# Patient Record
Sex: FEMALE | URBAN - METROPOLITAN AREA
[De-identification: names, ages, dates, MRNs, and addresses within clinical notes are randomized per-mention and may not be internally consistent; named-entity substitution may affect disease eponyms.]

---

## 2023-05-23 ENCOUNTER — APPOINTMENT (OUTPATIENT)
Dept: URBAN - METROPOLITAN AREA CLINIC 191 | Age: 23
Setting detail: DERMATOLOGY
End: 2023-05-23

## 2023-05-23 DIAGNOSIS — L40.0 PSORIASIS VULGARIS: ICD-10-CM

## 2023-05-23 PROCEDURE — OTHER ADDITIONAL NOTES: OTHER

## 2023-05-23 PROCEDURE — OTHER PRESCRIPTION MEDICATION MANAGEMENT: OTHER

## 2023-05-23 PROCEDURE — 99204 OFFICE O/P NEW MOD 45 MIN: CPT

## 2023-05-23 PROCEDURE — OTHER COUNSELING: OTHER

## 2023-05-23 PROCEDURE — OTHER PRESCRIPTION: OTHER

## 2023-05-23 RX ORDER — CLOBETASOL PROPIONATE 0.5 MG/ML
SOLUTION TOPICAL
Qty: 50 | Refills: 1 | Status: ERX | COMMUNITY
Start: 2023-05-23

## 2023-05-23 RX ORDER — CALCIPOTRIENE 0.05 MG/ML
SOLUTION TOPICAL
Qty: 60 | Refills: 2 | Status: ERX | COMMUNITY
Start: 2023-05-23

## 2023-05-23 ASSESSMENT — LOCATION SIMPLE DESCRIPTION DERM: LOCATION SIMPLE: LEFT SCALP

## 2023-05-23 ASSESSMENT — LOCATION ZONE DERM: LOCATION ZONE: SCALP

## 2023-05-23 ASSESSMENT — LOCATION DETAILED DESCRIPTION DERM: LOCATION DETAILED: LEFT MEDIAL FRONTAL SCALP

## 2023-05-23 NOTE — PROCEDURE: ADDITIONAL NOTES
Additional Notes: 80-90% scalp covered in psoriasis\\nIL 17 vs IL 23 if we have to change biologics depending on GI results (Pt is currently doing Humira injections for ankylosing spondylitis) (She is getting evaluated for possible IBD)
Detail Level: Simple
Render Risk Assessment In Note?: no

## 2023-05-23 NOTE — PROCEDURE: PRESCRIPTION MEDICATION MANAGEMENT
Plan: Onset about a month ago, very itchy. No fingernail involvement. Just scalp\\n\\nStart Rx clobetasol 0.05 % scalp solution- Apply thin layer to scalp every night for 4 weeks then stop\\nStart Rx calcipotriene 0.005 % scalp solution- Apply thin layer to scalp every night
Render In Strict Bullet Format?: No
Detail Level: Zone

## 2023-07-14 ENCOUNTER — APPOINTMENT (OUTPATIENT)
Dept: URBAN - METROPOLITAN AREA CLINIC 191 | Age: 23
Setting detail: DERMATOLOGY
End: 2023-07-14

## 2023-07-14 DIAGNOSIS — L40.0 PSORIASIS VULGARIS: ICD-10-CM

## 2023-07-14 PROCEDURE — 99213 OFFICE O/P EST LOW 20 MIN: CPT

## 2023-07-14 PROCEDURE — OTHER MIPS QUALITY: OTHER

## 2023-07-14 PROCEDURE — OTHER ADDITIONAL NOTES: OTHER

## 2023-07-14 PROCEDURE — OTHER COUNSELING: OTHER

## 2023-07-14 PROCEDURE — OTHER PRESCRIPTION MEDICATION MANAGEMENT: OTHER

## 2023-07-14 PROCEDURE — OTHER PRESCRIPTION: OTHER

## 2023-07-14 RX ORDER — PHARMACY COMPOUNDING ACCESSORY
EACH MISCELLANEOUS
Qty: 1 | Refills: 0 | Status: ERX | COMMUNITY
Start: 2023-07-14

## 2023-07-14 RX ORDER — CLOBETASOL PROPIONATE 0.5 MG/ML
SOLUTION TOPICAL
Qty: 50 | Refills: 1 | Status: ERX

## 2023-07-14 RX ORDER — CALCIPOTRIENE 0.05 MG/ML
SOLUTION TOPICAL
Qty: 60 | Refills: 2 | Status: ERX

## 2023-07-14 ASSESSMENT — LOCATION ZONE DERM: LOCATION ZONE: SCALP

## 2023-07-14 ASSESSMENT — LOCATION SIMPLE DESCRIPTION DERM: LOCATION SIMPLE: LEFT SCALP

## 2023-07-14 ASSESSMENT — LOCATION DETAILED DESCRIPTION DERM: LOCATION DETAILED: LEFT MEDIAL FRONTAL SCALP

## 2023-07-14 ASSESSMENT — BSA PSORIASIS: % BODY COVERED IN PSORIASIS: 1

## 2023-07-14 NOTE — PROCEDURE: PRESCRIPTION MEDICATION MANAGEMENT
Plan: much better! tapering topical steroid now. Planning to get colonoscopy next mo for IBD w/u. \\nscalp psoriasis Onset about a May 2023, very itchy. No fingernail involvement. Just scalp\\n decrease Rx clobetasol 0.05 % scalp solution- Apply thin layer to scalp every Saturday morning and Saturday night and Sunday morning and night.\\nContinue Rx calcipotriene 0.005 % scalp solution- Apply thin layer to scalp every night
Render In Strict Bullet Format?: No
Detail Level: Zone

## 2023-07-14 NOTE — PROCEDURE: ADDITIONAL NOTES
Additional Notes: initially 80-90% scalp covered in psoriasis\\n(Pt is currently doing Humira injections for ankylosing spondylitis) (She is getting evaluated for possible IBD)
Detail Level: Simple
Render Risk Assessment In Note?: no

## 2024-03-14 ENCOUNTER — APPOINTMENT (OUTPATIENT)
Dept: URBAN - METROPOLITAN AREA CLINIC 191 | Age: 24
Setting detail: DERMATOLOGY
End: 2024-03-14

## 2024-03-14 DIAGNOSIS — L40.0 PSORIASIS VULGARIS: ICD-10-CM

## 2024-03-14 PROCEDURE — OTHER ADDITIONAL NOTES: OTHER

## 2024-03-14 PROCEDURE — OTHER PRESCRIPTION: OTHER

## 2024-03-14 PROCEDURE — OTHER MIPS QUALITY: OTHER

## 2024-03-14 PROCEDURE — 99214 OFFICE O/P EST MOD 30 MIN: CPT

## 2024-03-14 PROCEDURE — OTHER COUNSELING: OTHER

## 2024-03-14 PROCEDURE — OTHER PRESCRIPTION MEDICATION MANAGEMENT: OTHER

## 2024-03-14 RX ORDER — ROFLUMILAST 3 MG/G
CREAM TOPICAL
Qty: 60 | Refills: 1 | Status: ERX | COMMUNITY
Start: 2024-03-14

## 2024-03-14 RX ORDER — ROFLUMILAST 3 MG/G
AEROSOL, FOAM TOPICAL
Qty: 60 | Refills: 2 | Status: ERX | COMMUNITY
Start: 2024-03-14

## 2024-03-14 RX ORDER — CLOBETASOL PROPIONATE 0.5 MG/ML
SOLUTION TOPICAL
Qty: 50 | Refills: 1 | Status: ERX

## 2024-03-14 RX ORDER — CALCIPOTRIENE 0.05 MG/ML
SOLUTION TOPICAL
Qty: 60 | Refills: 2 | Status: ERX

## 2024-03-14 ASSESSMENT — ITCH NUMERIC RATING SCALE: HOW SEVERE IS YOUR ITCHING?: 0

## 2024-03-14 ASSESSMENT — LOCATION SIMPLE DESCRIPTION DERM: LOCATION SIMPLE: LEFT SCALP

## 2024-03-14 ASSESSMENT — LOCATION ZONE DERM: LOCATION ZONE: SCALP

## 2024-03-14 ASSESSMENT — BSA PSORIASIS: % BODY COVERED IN PSORIASIS: 1

## 2024-03-14 ASSESSMENT — LOCATION DETAILED DESCRIPTION DERM: LOCATION DETAILED: LEFT MEDIAL FRONTAL SCALP

## 2024-03-14 NOTE — PROCEDURE: ADDITIONAL NOTES
Additional Notes: initially 80-90% scalp covered in psoriasis\\n(Pt is currently doing Humira injections for ankylosing spondylitis) \\nPt had a Colonoscopy done last year, the results were negative. Based of the symptoms GI doctor is still worried.
Detail Level: Simple
Render Risk Assessment In Note?: no

## 2024-03-14 NOTE — PROCEDURE: PRESCRIPTION MEDICATION MANAGEMENT
Plan: Pt reports her scalp is much better today than usual.  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 Sometimes very swollen. She is also concerned about the amount of topical steroid she is using. \\n\\nCurrently using 15 days of once daily clobetasol which I reassured her is safe. No evidence of skin atrophy\\n\\nStart Zoryve topical foam - apply once daily as a maintenance medication when not using clobetasol solution \\nStart Zoryve topical cream - apply once a day on the rash located on the eyelid\\n\\nc/w Clobetasol 0.05 % scalp solution for flares- Apply thin layer to scalp every other night (no more than 20 days a month) \\nc/w Calcipotriene 0.005 % scalp solution for flares- Apply thin layer to scalp every night \\n\\n\\nDiscussed switching to Tremfya would offer better cutaneous results while still treating her arthritis.  But would defer to Dr. Calloway to weigh in if Tremfya would be able to control her arthritis as well as Humira. Once weekly Humira dosing should also improve her skin psoriasis - Pt will f/u with Rheumatologist Dr. Calloway. OV to be sent to Rheumatologist.
Render In Strict Bullet Format?: No
Detail Level: Zone

## 2024-07-17 ENCOUNTER — APPOINTMENT (OUTPATIENT)
Dept: URBAN - METROPOLITAN AREA CLINIC 191 | Age: 24
Setting detail: DERMATOLOGY
End: 2024-07-17

## 2024-07-17 DIAGNOSIS — L40.0 PSORIASIS VULGARIS: ICD-10-CM

## 2024-07-17 PROBLEM — L30.9 DERMATITIS, UNSPECIFIED: Status: ACTIVE | Noted: 2024-07-17

## 2024-07-17 PROCEDURE — 99214 OFFICE O/P EST MOD 30 MIN: CPT

## 2024-07-17 PROCEDURE — OTHER PRESCRIPTION: OTHER

## 2024-07-17 PROCEDURE — OTHER PRESCRIPTION MEDICATION MANAGEMENT: OTHER

## 2024-07-17 PROCEDURE — OTHER ADDITIONAL NOTES: OTHER

## 2024-07-17 PROCEDURE — OTHER MIPS QUALITY: OTHER

## 2024-07-17 PROCEDURE — OTHER COUNSELING: OTHER

## 2024-07-17 RX ORDER — KETOCONAZOLE 20 MG/ML
APPLY SHAMPOO, SUSPENSION TOPICAL AS DIRECTED
Qty: 120 | Refills: 5 | Status: ERX | COMMUNITY
Start: 2024-07-17

## 2024-07-17 RX ORDER — CLOBETASOL PROPIONATE 0.5 MG/ML
SOLUTION TOPICAL
Qty: 50 | Refills: 1 | Status: ERX

## 2024-07-17 RX ORDER — CALCIPOTRIENE 0.05 MG/ML
SOLUTION TOPICAL
Qty: 60 | Refills: 2 | Status: ERX

## 2024-07-17 ASSESSMENT — ITCH NUMERIC RATING SCALE: HOW SEVERE IS YOUR ITCHING?: 2

## 2024-07-17 ASSESSMENT — LOCATION SIMPLE DESCRIPTION DERM: LOCATION SIMPLE: LEFT SCALP

## 2024-07-17 ASSESSMENT — BSA PSORIASIS: % BODY COVERED IN PSORIASIS: 1

## 2024-07-17 ASSESSMENT — LOCATION ZONE DERM: LOCATION ZONE: SCALP

## 2024-07-17 ASSESSMENT — LOCATION DETAILED DESCRIPTION DERM: LOCATION DETAILED: LEFT MEDIAL FRONTAL SCALP

## 2024-07-17 NOTE — PROCEDURE: PRESCRIPTION MEDICATION MANAGEMENT
Plan: Currently using 15 days of once daily clobetasol which I reassured her is safe. No evidence of skin atrophy\\n\\nd/c Zoryve topical foam - too oily\\nContinue Zoryve topical cream - apply once a day on the rash located on the eyelid qday prn\\n\\nc/w Clobetasol 0.05 % scalp solution for flares- Apply thin layer to scalp every other night (no more than 20 days a month) \\nrestart Calcipotriene 0.005 % scalp solution- Apply thin layer to scalp every other night with the clobetasol sol\\n\\nStart ketoconazole 2 % shampoo Lather into scalp and leave in for 5 minutes before rinsing off. Use 2 times weekly\\n \\n\\nStart T-Gel shampoo alternating with Ketoconazole shampoo and home shampoo\\n\\nPatient has 10% Scalp involvement currently. 
Render In Strict Bullet Format?: No
Detail Level: Zone